# Patient Record
Sex: MALE | Race: BLACK OR AFRICAN AMERICAN | Employment: FULL TIME | RURAL
[De-identification: names, ages, dates, MRNs, and addresses within clinical notes are randomized per-mention and may not be internally consistent; named-entity substitution may affect disease eponyms.]

---

## 2021-03-24 ENCOUNTER — OFFICE VISIT (OUTPATIENT)
Dept: PRIMARY CARE CLINIC | Age: 56
End: 2021-03-24
Payer: COMMERCIAL

## 2021-03-24 DIAGNOSIS — Z20.822 ENCOUNTER FOR LABORATORY TESTING FOR COVID-19 VIRUS: Primary | ICD-10-CM

## 2021-03-24 PROCEDURE — 99211 OFF/OP EST MAY X REQ PHY/QHP: CPT | Performed by: NURSE PRACTITIONER

## 2021-03-24 NOTE — PROGRESS NOTES
Pt presents to the flu clinic today requesting a covid test. Pt denies symptoms but has had a possible exposure. Pt declined to see a provider.  Verbal consent received to perform test. SISI

## 2021-03-26 LAB
SARS-COV-2, NAA 2 DAY TAT: NORMAL
SARS-COV-2, NAA: NOT DETECTED

## 2021-06-18 ENCOUNTER — APPOINTMENT (OUTPATIENT)
Dept: GENERAL RADIOLOGY | Age: 56
End: 2021-06-18
Attending: EMERGENCY MEDICINE
Payer: COMMERCIAL

## 2021-06-18 ENCOUNTER — HOSPITAL ENCOUNTER (EMERGENCY)
Age: 56
Discharge: HOME | End: 2021-06-18
Attending: EMERGENCY MEDICINE
Payer: COMMERCIAL

## 2021-06-18 VITALS
RESPIRATION RATE: 18 BRPM | OXYGEN SATURATION: 100 % | HEIGHT: 72 IN | WEIGHT: 145 LBS | SYSTOLIC BLOOD PRESSURE: 112 MMHG | DIASTOLIC BLOOD PRESSURE: 72 MMHG | HEART RATE: 64 BPM | BODY MASS INDEX: 19.64 KG/M2 | TEMPERATURE: 98.4 F

## 2021-06-18 DIAGNOSIS — J02.9 ACUTE PHARYNGITIS, UNSPECIFIED ETIOLOGY: ICD-10-CM

## 2021-06-18 DIAGNOSIS — J06.9 ACUTE URI: Primary | ICD-10-CM

## 2021-06-18 LAB
DEPRECATED S PYO AG THROAT QL EIA: NEGATIVE
FLUAV RNA SPEC QL NAA+PROBE: NOT DETECTED
FLUBV RNA SPEC QL NAA+PROBE: NOT DETECTED
SARS-COV-2, COV2: NOT DETECTED

## 2021-06-18 PROCEDURE — 87880 STREP A ASSAY W/OPTIC: CPT

## 2021-06-18 PROCEDURE — 87636 SARSCOV2 & INF A&B AMP PRB: CPT

## 2021-06-18 PROCEDURE — 71045 X-RAY EXAM CHEST 1 VIEW: CPT

## 2021-06-18 PROCEDURE — 87070 CULTURE OTHR SPECIMN AEROBIC: CPT

## 2021-06-18 PROCEDURE — 99283 EMERGENCY DEPT VISIT LOW MDM: CPT

## 2021-06-18 RX ORDER — LORATADINE 10 MG/1
10 TABLET ORAL DAILY
Qty: 20 TABLET | Refills: 0 | Status: SHIPPED | OUTPATIENT
Start: 2021-06-18

## 2021-06-18 RX ORDER — BENZONATATE 100 MG/1
100 CAPSULE ORAL
Qty: 30 CAPSULE | Refills: 0 | Status: SHIPPED | OUTPATIENT
Start: 2021-06-18 | End: 2021-06-25

## 2021-06-18 RX ORDER — FLUTICASONE PROPIONATE 50 MCG
2 SPRAY, SUSPENSION (ML) NASAL DAILY
Qty: 1 BOTTLE | Refills: 0 | Status: SHIPPED | OUTPATIENT
Start: 2021-06-18

## 2021-06-18 NOTE — ED TRIAGE NOTES
Pt arrived by POV with complaint of cough that started yesterday, pt reports he started feel bad yesterday. Pt reports chest pain only when he coughs and that his cough is productive with white sputum. Pt is awake alert and oriented x 4. Pt speaking in full complete sentences  NAD.   Pt educated on ER flow

## 2021-06-18 NOTE — Clinical Note
[facility] EMERGENCY DEP 
[address] [address] 
[phone] Work/School Note Date: 6/18/2021 To Whom It May concern: 
 
Niall Cuenca was seen and treated today in the emergency room by the following provider(s): 
Attending Provider: Bety Steele MD. Niall Cuenca is excused from work/school on 06/18/21 and 06/19/21. He is medically clear to return to work/school on 6/20/2021. Sincerely, Isma Chahal MD

## 2021-06-18 NOTE — ED PROVIDER NOTES
EMERGENCY DEPARTMENT HISTORY AND PHYSICAL EXAM      Date: 6/18/2021  Patient Name: Vannesa     History of Presenting Illness     Chief Complaint   Patient presents with    Cough       History Provided By: Patient    HPI:   The history is provided by the patient. Cough  This is a new problem. The current episode started yesterday. The problem occurs hourly. The problem has not changed since onset. The cough is productive of sputum. There has been no fever. Associated symptoms include chest pain, rhinorrhea, sore throat and myalgias. Pertinent negatives include no chills, no sweats, no eye redness, no ear congestion, no ear pain, no headaches, no shortness of breath, no wheezing, no nausea, no vomiting and no confusion. He has tried nothing for the symptoms. The treatment provided no relief. He is a smoker. His past medical history does not include pneumonia, COPD or asthma. Vannesa , 49 y.o. male  presents to the ED with cc of cough sore throat that started yesterday. Patient reports a coworker was sick at work recently and now he has similar symptoms. Does report slight myalgias runny nose nasal congestion cough productive of clear to white sputum with a sore throat. Denies any earache fevers chills. Reports his chest hurts when his cough. Denies hemoptysis. Denies abdominal pain nausea vomiting diarrhea. Is taken no medications. Is a current smoker. He denies any known coronavirus exposures. There are no other complaints, changes, or physical findings at this time. PCP: Jack Costa Not On File, MD    No current facility-administered medications on file prior to encounter. No current outpatient medications on file prior to encounter. Past History     Past Medical History:  Past Medical History:   Diagnosis Date    Hypertension        Past Surgical History:  History reviewed. No pertinent surgical history. Family History:  History reviewed.  No pertinent family history. Social History:  Social History     Tobacco Use    Smoking status: Light Tobacco Smoker    Smokeless tobacco: Never Used   Vaping Use    Vaping Use: Never used   Substance Use Topics    Alcohol use: Never    Drug use: Never       Allergies: Allergies   Allergen Reactions    Lisinopril-Hydrochlorothiazide Angioedema     Angioedema of Uvula, Soft Palate, post oropharynx in ED on 12/8/2019         Review of Systems   Review of Systems   Constitutional: Negative. Negative for chills and fever. HENT: Positive for congestion, rhinorrhea and sore throat. Negative for ear pain. Eyes: Negative. Negative for discharge and redness. Respiratory: Positive for cough. Negative for shortness of breath and wheezing. Cardiovascular: Positive for chest pain. Negative for palpitations. Gastrointestinal: Negative. Negative for abdominal pain, nausea and vomiting. Endocrine: Negative. Negative for polydipsia and polyuria. Genitourinary: Negative. Negative for dysuria, flank pain and frequency. Musculoskeletal: Positive for myalgias. Negative for arthralgias, back pain and neck pain. Skin: Negative. Negative for rash and wound. Allergic/Immunologic: Negative. Neurological: Negative. Negative for dizziness and headaches. Hematological: Negative. Negative for adenopathy. Does not bruise/bleed easily. Psychiatric/Behavioral: Negative. Negative for confusion. The patient is not nervous/anxious. All other systems reviewed and are negative. Physical Exam   Physical Exam  Vitals and nursing note reviewed. Constitutional:       General: He is not in acute distress. Appearance: Normal appearance. He is well-developed. He is not ill-appearing, toxic-appearing or diaphoretic. HENT:      Head: Normocephalic and atraumatic. Right Ear: Tympanic membrane normal.      Left Ear: Tympanic membrane normal.      Nose: Congestion present.       Mouth/Throat:      Mouth: Mucous membranes are moist.      Pharynx: Oropharynx is clear. Posterior oropharyngeal erythema present. No oropharyngeal exudate. Eyes:      General:         Right eye: No discharge. Left eye: No discharge. Extraocular Movements: Extraocular movements intact. Conjunctiva/sclera: Conjunctivae normal.      Pupils: Pupils are equal, round, and reactive to light. Cardiovascular:      Rate and Rhythm: Normal rate and regular rhythm. Pulses: Normal pulses. Heart sounds: Normal heart sounds. Pulmonary:      Effort: Pulmonary effort is normal. No respiratory distress. Breath sounds: Normal breath sounds. No wheezing, rhonchi or rales. Abdominal:      General: There is no distension. Palpations: Abdomen is soft. Musculoskeletal:         General: No swelling or tenderness. Normal range of motion. Cervical back: Normal range of motion and neck supple. No rigidity. No muscular tenderness. Right lower leg: No edema. Left lower leg: No edema. Skin:     General: Skin is warm and dry. Capillary Refill: Capillary refill takes less than 2 seconds. Findings: No erythema or rash. Neurological:      General: No focal deficit present. Mental Status: He is alert and oriented to person, place, and time. Mental status is at baseline. Cranial Nerves: No cranial nerve deficit. Sensory: No sensory deficit. Psychiatric:         Mood and Affect: Mood normal.         Behavior: Behavior normal.         Thought Content:  Thought content normal.         Judgment: Judgment normal.         Diagnostic Study Results     Labs -     Recent Results (from the past 12 hour(s))   COVID-19 WITH INFLUENZA A/B    Collection Time: 06/18/21 11:00 AM   Result Value Ref Range    SARS-CoV-2 Not detected NOTD      Influenza A by PCR Not detected NOTD      Influenza B by PCR Not detected NOTD     STREP AG SCREEN, GROUP A    Collection Time: 06/18/21 11:00 AM    Specimen: Serum; Throat   Result Value Ref Range    Group A Strep Ag ID Negative NEG         Radiologic Studies -   XR CHEST PORT   Final Result      No acute process. CT Results  (Last 48 hours)    None        CXR Results  (Last 48 hours)               06/18/21 1104  XR CHEST PORT Final result    Impression:      No acute process. Narrative:  EXAM:  XR CHEST PORT       INDICATION:  cough       COMPARISON:  None. FINDINGS:       A portable AP radiograph of the chest was obtained at 10:51 hours. The lungs are   clear. The cardiac and mediastinal contours and pulmonary vascularity are   normal.  The bones and soft tissues are unremarkable. A nipple shadow projects   over the right lower chest on one of the projections. 2 images were obtained. Medical Decision Making   I am the first provider for this patient. I reviewed the vital signs, available nursing notes, past medical history, past surgical history, family history and social history. Vital Signs-Reviewed the patient's vital signs. Patient Vitals for the past 12 hrs:   Temp Pulse Resp BP SpO2   06/18/21 1118     100 %   06/18/21 1044 99 °F (37.2 °C) 68 18 (!) 145/92 100 %                 Records Reviewed: Nursing Notes    Provider Notes (Medical Decision Making):   Patient with upper respiratory infection symptoms, will check a strep chest x-ray and a COVID-19 test.    ED Course:   Initial assessment performed. The patients presenting problems have been discussed, and they are in agreement with the care plan formulated and outlined with them. I have encouraged them to ask questions as they arise throughout their visit. ED Course as of Jun 18 1243   Fri Jun 18, 2021   1242 Reviewed results treatment plan follow-up plan with patient.     [MF]      ED Course User Index  [MF] Digna Gao MD           12:44 PM    Patient with upper respiratory symptoms sore throat, strep is negative Covid test is negative, chest x-ray clear.  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 Will recommend symptomatic treatment fluids we will give him a work note and will refer to PCP for follow-up    Pt has been re-examined and states that they are feeling better and have no new complaints. Laboratory tests, medications, x-rays, diagnosis, follow up plan and return instructions have been reviewed and discussed with the patient and/or family. Pt and/or family were instructed on symptoms that may arise after discharge requiring re-evaluation by a physician. Pt and/or family have had the opportunity to ask questions about their care. Patient and/or family verbalized understanding and agreement with care plan, follow up and return instructions. Patient and/or family agree to return in 50 hours if their symptoms are not improving or immediately if they have any change in their condition. I have also put together some discharge instructions for patient that include: 1) educational information regarding their diagnosis, 2) how to care for their diagnosis at home, as well a 3) list of reasons why they would want to return to the ED prior to their follow-up appointment, should their condition change. Sean Carias MD      Procedures          Disposition:    Discharged      DISCHARGE PLAN:  1. Current Discharge Medication List      START taking these medications    Details   loratadine (Claritin) 10 mg tablet Take 1 Tablet by mouth daily. Qty: 20 Tablet, Refills: 0  Start date: 6/18/2021      fluticasone propionate (FLONASE) 50 mcg/actuation nasal spray 2 Sprays by Both Nostrils route daily. Qty: 1 Bottle, Refills: 0  Start date: 6/18/2021      benzonatate (Tessalon Perles) 100 mg capsule Take 1 Capsule by mouth three (3) times daily as needed for Cough for up to 7 days. Qty: 30 Capsule, Refills: 0  Start date: 6/18/2021, End date: 6/25/2021           2.    Follow-up Information     Follow up With Specialties Details Why [address] [place] [address]  Schedule an appointment as soon as possible for a visit in 2 days For follow up, If not improving [address]    18 [facility] Emergency Medicine Go to  If not improving [address] [address]  [phone]        3. Return to ED if worse     Diagnosis     Clinical Impression:   1. Acute URI    2. Acute pharyngitis, unspecified etiology        Attestations: Alex Snyder MD    Please note that this dictation was completed with [employer], the computer voice recognition software. Quite often unanticipated grammatical, syntax, homophones, and other interpretive errors are inadvertently transcribed by the computer software. Please disregard these errors. Please excuse any errors that have escaped final proofreading. Thank you.

## 2021-06-20 LAB
BACTERIA SPEC CULT: NORMAL
SERVICE CMNT-IMP: NORMAL

## 2021-12-22 ENCOUNTER — HOSPITAL ENCOUNTER (EMERGENCY)
Age: 56
Discharge: HOME | End: 2021-12-22
Attending: EMERGENCY MEDICINE
Payer: COMMERCIAL

## 2021-12-22 VITALS
DIASTOLIC BLOOD PRESSURE: 77 MMHG | TEMPERATURE: 98.3 F | SYSTOLIC BLOOD PRESSURE: 114 MMHG | BODY MASS INDEX: 19.25 KG/M2 | HEIGHT: 74 IN | WEIGHT: 150 LBS | OXYGEN SATURATION: 100 % | RESPIRATION RATE: 16 BRPM | HEART RATE: 70 BPM

## 2021-12-22 DIAGNOSIS — S86.811A STRAIN OF RIGHT CALF MUSCLE: Primary | ICD-10-CM

## 2021-12-22 PROCEDURE — 99284 EMERGENCY DEPT VISIT MOD MDM: CPT

## 2021-12-22 RX ORDER — IBUPROFEN 600 MG/1
600 TABLET ORAL
Qty: 20 TABLET | Refills: 0 | Status: SHIPPED | OUTPATIENT
Start: 2021-12-22

## 2021-12-22 RX ORDER — IBUPROFEN 600 MG/1
600 TABLET ORAL
Qty: 20 TABLET | Refills: 0 | Status: SHIPPED | OUTPATIENT
Start: 2021-12-22 | End: 2021-12-22 | Stop reason: SDUPTHER

## 2021-12-22 NOTE — ED PROVIDER NOTES
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 Pain in calf and knee no obvious swelling. History Provided By: Patient    HPI: Sheldon Gomez is a 49 y.o. male, pmhx listed below, who presents to the ED c/o leg pain. Patient reports he stepped off a forklift and had immediate pain in left calf. Reports he heard a pop when he stepped off of the forklift. Now reports persistent pain and swelling. Pain is moderate to severe. No treatments or evaluations for symptoms prior to arrival.  Injury occurred approximately 2 hours ago. PCP: Avery, Not On File, MD    There are no other complaints, changes, or physical findings at this time. Past History       Past Medical History:  Past Medical History:   Diagnosis Date    Hypertension        Past Surgical History:  History reviewed. No pertinent surgical history. Family History:  History reviewed. No pertinent family history. Social History:  Social History     Tobacco Use    Smoking status: Light Tobacco Smoker    Smokeless tobacco: Never Used   Vaping Use    Vaping Use: Never used   Substance Use Topics    Alcohol use: Never    Drug use: Never       Current Outpatient Medications   Medication Sig Dispense Refill    ibuprofen (MOTRIN) 600 mg tablet Take 1 Tablet by mouth every six (6) hours as needed for Pain. 20 Tablet 0    loratadine (Claritin) 10 mg tablet Take 1 Tablet by mouth daily. 20 Tablet 0    fluticasone propionate (FLONASE) 50 mcg/actuation nasal spray 2 Sprays by Both Nostrils route daily. 1 Bottle 0       Allergies:   Allergies   Allergen Reactions    Lisinopril-Hydrochlorothiazide Angioedema     Angioedema of Uvula, Soft Palate, post oropharynx in ED on 12/8/2019         Review of Systems   Review of Systems Constitutional: Negative for chills and fever. HENT: Negative for ear pain. Eyes: Negative for pain. Respiratory: Negative for shortness of breath. Cardiovascular: Negative for chest pain. Gastrointestinal: Negative for abdominal pain. Genitourinary: Negative for flank pain. Musculoskeletal: Negative for back pain. Skin: Negative for rash. Neurological: Negative for headaches. Psychiatric/Behavioral: Negative for agitation. Physical Exam     Vital Signs-Reviewed the patient's vital signs. Patient Vitals for the past 12 hrs:   Temp Pulse Resp BP SpO2   12/22/21 1308 98.3 °F (36.8 °C) 76 16 (!) 154/85 100 %       Physical Exam  Vitals reviewed. HENT:      Head: Normocephalic and atraumatic. Mouth/Throat:      Mouth: Mucous membranes are moist.   Cardiovascular:      Rate and Rhythm: Normal rate. Pulmonary:      Effort: Pulmonary effort is normal.   Musculoskeletal:      Cervical back: Normal range of motion. Comments: Tenderness along medial aspect of left calf with some associated swelling. Recinos test intact. Achilles intact on palpation. Normal DP pulse. Knee nontender. Normal range of motion, ankle nontender, normal range of motion   Skin:     General: Skin is warm and dry. Neurological:      Mental Status: He is alert and oriented to person, place, and time. Sensory: No sensory deficit. Motor: No weakness. Comments: Antalgic gait   Psychiatric:         Mood and Affect: Mood normal.         Diagnostic Study Results     Labs -   No results found for this or any previous visit (from the past 12 hour(s)). Radiologic Studies -   No orders to display     CT Results  (Last 48 hours)    None        CXR Results  (Last 48 hours)    None            Medical Decision Making   I am the first provider for this patient.     I reviewed the vital signs, available nursing notes, past medical history, past surgical history, family history and social history. Records Reviewed: Nursing Notes and Old Medical Records    Provider Notes (Medical Decision Making):   MDM: 56-year-old male with left calf pain following hyper dorsiflexion of foot. No signs of Achilles rupture. Most likely calf muscle tear/strain. Mechanism of injury also not suggestive of DVT. Will recommend patient rest, elevate foot, as needed NSAIDs. Will follow up as instructed. All questions have been answered, pt voiced understanding and agreement with plan. Specific return precautions provided as well as instructions to return to the ED should sx worsen at any time. Vital signs stable for discharge. Diagnosis     Clinical Impression:   1. Strain of right calf muscle            Disposition:  Discharged    Current Discharge Medication List      START taking these medications    Details   ibuprofen (MOTRIN) 600 mg tablet Take 1 Tablet by mouth every six (6) hours as needed for Pain. Qty: 20 Tablet, Refills: 0  Start date: 12/22/2021               Please note, this dictation was completed with [employer], the computer voice recognition software. Quite often unanticipated grammatical, syntax, homophones, and other interpretive errors are inadvertently transcribed by the computer software. Please disregard these errors. Please excuse any errors that have escaped final proof reading.

## 2021-12-22 NOTE — Clinical Note
[address] [place] [address] EMERGENCY DEP  [address] [facility] [address] [place] [address]  [phone]    Work/School Note    Date: 12/22/2021    To Whom It May concern:    Willem Garduno was seen and treated today in the emergency room by the following provider(s):  Attending Provider: Lianna Cabezas MD.      Willem Garduno is excused from work/school on 12/22/2021 through 12/24/2021. He is medically clear to return to work/school on 12/25/2021.          Sincerely,          Nicole Stein MD

## 2021-12-22 NOTE — ED NOTES
I have reviewed discharge instructions with the patient. The patient verbalized understanding. Discharge medications discussed with patient. No questions at this time.  Ambulated out without difficulty on crutches

## 2021-12-22 NOTE — Clinical Note
[address] [place] [address] EMERGENCY DEP  [address] [facility] [address] [place] [address]  [phone]    Work/School Note    Date: 12/22/2021    To Whom It May concern:    Sheldon Gomez was seen and treated today in the emergency room by the following provider(s):  Attending Provider: Kevin Myers MD.      Sheldon Gomez is excused from work/school on 12/22/2021 through 12/24/2021. He is medically clear to return to work/school on 12/25/2021.          Sincerely,          Ford Pisano RN

## 2025-08-28 ENCOUNTER — HOSPITAL ENCOUNTER (EMERGENCY)
Facility: HOSPITAL | Age: 60
Discharge: HOME | End: 2025-08-28
Attending: EMERGENCY MEDICINE

## 2025-08-28 ENCOUNTER — APPOINTMENT (OUTPATIENT)
Facility: HOSPITAL | Age: 60
End: 2025-08-28

## 2025-08-28 VITALS
HEART RATE: 65 BPM | SYSTOLIC BLOOD PRESSURE: 199 MMHG | RESPIRATION RATE: 18 BRPM | DIASTOLIC BLOOD PRESSURE: 97 MMHG | OXYGEN SATURATION: 99 % | TEMPERATURE: 98.4 F

## 2025-08-28 DIAGNOSIS — S46.911A STRAIN OF RIGHT SHOULDER, INITIAL ENCOUNTER: Primary | ICD-10-CM

## 2025-08-28 PROCEDURE — 99283 EMERGENCY DEPT VISIT LOW MDM: CPT

## 2025-08-28 PROCEDURE — 73030 X-RAY EXAM OF SHOULDER: CPT

## 2025-08-28 ASSESSMENT — PAIN DESCRIPTION - LOCATION: LOCATION: SHOULDER

## 2025-08-28 ASSESSMENT — PAIN SCALES - GENERAL: PAINLEVEL_OUTOF10: 10

## 2025-08-28 ASSESSMENT — LIFESTYLE VARIABLES
HOW OFTEN DO YOU HAVE A DRINK CONTAINING ALCOHOL: NEVER
HOW MANY STANDARD DRINKS CONTAINING ALCOHOL DO YOU HAVE ON A TYPICAL DAY: PATIENT DOES NOT DRINK

## 2025-08-28 ASSESSMENT — PAIN - FUNCTIONAL ASSESSMENT: PAIN_FUNCTIONAL_ASSESSMENT: 0-10

## 2025-08-28 ASSESSMENT — PAIN DESCRIPTION - DESCRIPTORS: DESCRIPTORS: ACHING

## 2025-08-28 ASSESSMENT — PAIN DESCRIPTION - ORIENTATION: ORIENTATION: RIGHT
